# Patient Record
(demographics unavailable — no encounter records)

---
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
## 2018-09-22 NOTE — PDOC DISCHARGE SUMMARY
General





- Admit/Disc Date/PCP


Admission Date/Primary Care Provider: 


  18 10:32





  





Discharge Date: 18





- Discharge Diagnosis


(1) Retained products of conception after miscarriage


Is this a current diagnosis for this admission?: Yes   





(2) Spontaneous 


Is this a current diagnosis for this admission?: Yes   





- Additional Information


Resuscitation Status: Full Code


Discharge Diet: Regular


Discharge Activity: Balance Activity w/Rest


Prescriptions: 


Ibuprofen [Motrin 800 mg Tablet] 800 mg PO Q8HP PRN #30 tablet


 PRN Reason: 


Home Medications: 








Acetaminophen [Tylenol Extra Strength 500 mg Tablet] 1,000 mg PO Q5HP PRN  


Prenatal 95/Iron Fum/Folic/Dha [Prenatal + Dha Combo Pack] 1 each PO DAILY  


Ibuprofen [Motrin 800 mg Tablet] 800 mg PO Q8HP PRN #30 tablet 18 











History of Present Illness


History of Present Illness: 


MARYELLEN FRANCIS is a 24 year old female








Physical Exam





- Physical Exam


Vital Signs: 


 











Temp Pulse Resp BP Pulse Ox


 


 97.8 F   78   16   110/68   99 


 


 18 03:20  18 03:20  18 03:20  18 03:20  18 03:20








 Intake & Output











 18





 06:59 06:59 06:59


 


Intake Total  2640 


 


Balance  2640 


 


Weight  193 kg 











Focused psych exam: ABSENT: catatonic, delusional - normal affect, euphoric, 

flight of ideas, internal stimuli, paranoid, pressured speech, psychomotor 

agitation, restlessness, other





- Gynecological Exam


Uterus: normal - nontender, low, firm





Result


Laboratory Results: 


 





 18 07:02 





 18 11:37 





 











  18





  11:37 11:37 11:37


 


WBC  13.9 H  


 


RBC  3.95  


 


Hgb  11.4 L  


 


Hct  32.6 L  


 


MCV  82  


 


MCH  28.8  


 


MCHC  34.9  


 


RDW  13.6  


 


Plt Count  229  


 


Seg Neutrophils %  86.3 H  


 


Lymphocytes %  8.2 L  


 


Monocytes %  5.0  


 


Eosinophils %  0.1  


 


Basophils %  0.4  


 


Absolute Neutrophils  11.9 H  


 


Absolute Lymphocytes  1.1  


 


Absolute Monocytes  0.7  


 


Absolute Eosinophils  0.0  


 


Absolute Basophils  0.1  


 


Sodium   136.8 L 


 


Potassium   3.6 


 


Chloride   104 


 


Carbon Dioxide   23 


 


Anion Gap   10 


 


BUN   3 L 


 


Creatinine   0.30 L 


 


Est GFR ( Amer)   > 60 


 


Est GFR (Non-Af Amer)   > 60 


 


Glucose   87 


 


Calcium   9.5 


 


Total Bilirubin   0.9 


 


AST   18 


 


ALT   46 


 


Alkaline Phosphatase   92 


 


Total Protein   6.5 


 


Albumin   3.4 L 


 


Blood Type    O POSITIVE


 


Antibody Screen    NEGATIVE














  18





  07:02


 


WBC  7.3


 


RBC  3.69 L


 


Hgb  11.0 L


 


Hct  31.0 L


 


MCV  84


 


MCH  29.7


 


MCHC  35.4


 


RDW  14.1 H


 


Plt Count  204


 


Seg Neutrophils % 


 


Lymphocytes % 


 


Monocytes % 


 


Eosinophils % 


 


Basophils % 


 


Absolute Neutrophils 


 


Absolute Lymphocytes 


 


Absolute Monocytes 


 


Absolute Eosinophils 


 


Absolute Basophils 


 


Sodium 


 


Potassium 


 


Chloride 


 


Carbon Dioxide 


 


Anion Gap 


 


BUN 


 


Creatinine 


 


Est GFR ( Amer) 


 


Est GFR (Non-Af Amer) 


 


Glucose 


 


Calcium 


 


Total Bilirubin 


 


AST 


 


ALT 


 


Alkaline Phosphatase 


 


Total Protein 


 


Albumin 


 


Blood Type 


 


Antibody Screen 














Plan


Time Spent: Less than 30 Minutes - discharge today with f/u with Michelet at [facility] on 

tuesday